# Patient Record
Sex: MALE | Race: WHITE | NOT HISPANIC OR LATINO | Employment: FULL TIME | URBAN - METROPOLITAN AREA
[De-identification: names, ages, dates, MRNs, and addresses within clinical notes are randomized per-mention and may not be internally consistent; named-entity substitution may affect disease eponyms.]

---

## 2017-11-19 PROBLEM — R07.2 PRECORDIAL PAIN: Status: ACTIVE | Noted: 2017-11-19

## 2017-11-19 PROBLEM — I48.91 A-FIB: Status: ACTIVE | Noted: 2017-11-19

## 2020-01-22 ENCOUNTER — TELEPHONE (OUTPATIENT)
Dept: ELECTROPHYSIOLOGY | Facility: CLINIC | Age: 50
End: 2020-01-22

## 2020-01-22 DIAGNOSIS — I49.8 OTHER SPECIFIED CARDIAC ARRHYTHMIAS: Primary | ICD-10-CM

## 2020-01-23 ENCOUNTER — TELEPHONE (OUTPATIENT)
Dept: ELECTROPHYSIOLOGY | Facility: CLINIC | Age: 50
End: 2020-01-23

## 2020-01-23 NOTE — TELEPHONE ENCOUNTER
Relayed Xochitl's message below.  ----- Message from Jenn Allen RN sent at 1/23/2020  8:09 AM CST -----  Contact: self / [phone]  We haven't seen him in 7 years and cannot make recommendations without seeing him. The ER doc spoke with his general cardiologist, Dr Correa before giving him rx for Xarelto. Can you please call him and ask him to get Dr Correa's recommendations until we see him?  Thanks  ----- Message -----  From: Geneva Santiago MA  Sent: 1/22/2020   4:48 PM CST  To: Jenn Allen RN    I scheduled him for 1/27/2020.  He wanted me to let you know that he was not about to get the Xarelto b/c it was very expensive. Is there something he can take instead?    [place]  ----- Message -----  From: Jenn Allen RN  Sent: 1/22/2020   3:42 PM CST  To: Geneva Santiago MA    Can you please call? He was in ER today and needs appt for follow up. We have not seen him since 2013. Will need to be set up as new patient.   ----- Message -----  From: Geneva Santiago MA  Sent: 1/22/2020   3:21 PM CST  To: Jenn Allen RN        ----- Message -----  From: Kim Peraza  Sent: 1/22/2020   2:04 PM CST  To: Gerald Carrera Staff    Needs to speak with you on an appointment as well as medication . Please advise    He was a patient a long time ago, he was in the ER today for Atrial fibrillation

## 2020-01-26 NOTE — PROGRESS NOTES
Subjective:    Patient ID:  Peter Gardner Jr. is a 49 y.o. male who presents for evaluation of Atrial Fibrillation      HPI 50 yo male with h/o paroxysmal symptomatic atrial fibrillation, atrial flutter, cardiomyopathy.    Update:  Has had paroxysmal atrial fibrillation.  H/o cardiomyopathy at times of atrial fibrillation. Last echo, while in normal rhythm, was normal. Has strong aversion to medications.   Underwent PVI 4/27/12 (Cryo-ablation) without complications.  Ultimately developed symptomatic recurrence, paroxysmal.  He has utilized Flecainide as pill in pocket in the past.  Last seen by me in 2013, at which time I had recommended repeat ablation.  He deferred.    Update:  2017 presented to [facility] with symptomatic recurrence.  Took Flecainide.  Upon arrival to [facility] he was in atrial flutter.    Echo 11/19/17 EF 45-50%.  1/22/20 presented with symptomatic recurrence.  Was discharged in AF.  Prescribed Xarelto.  Deferred secondary to cost.  Prescribed metoprolol.  Feels poorly on this (trouble with focus, fatigue).  Notes palpitations at night lasting a couple of seconds.  ECG today reveals sinus bradycardia at 51 bpm.      Review of Systems   Constitution: Negative. Negative for malaise/fatigue.   Cardiovascular: Positive for palpitations. Negative for chest pain, dyspnea on exertion, irregular heartbeat, leg swelling, near-syncope, orthopnea, paroxysmal nocturnal dyspnea and syncope.   Respiratory: Negative for cough and shortness of breath.    Neurological: Negative for dizziness, light-headedness and weakness.   All other systems reviewed and are negative.       Objective:    Physical Exam   Constitutional: He is oriented to person, place, and time. He appears well-developed and well-nourished.   Eyes: Conjunctivae are normal. No scleral icterus.   Neck: No JVD present. No tracheal deviation present.   Cardiovascular: Regular rhythm and normal heart sounds. Bradycardia present. PMI is not displaced.    Pulmonary/Chest: Effort normal and breath sounds normal. No respiratory distress.   Abdominal: Soft. There is no hepatosplenomegaly. There is no tenderness.   Musculoskeletal: He exhibits no edema (lower extremity) or tenderness.   Neurological: He is alert and oriented to person, place, and time.   Skin: Skin is warm and dry. No rash noted.   Psychiatric: He has a normal mood and affect. His behavior is normal.         Assessment:       1. Paroxysmal atrial fibrillation    2. Atrial flutter, unspecified type    3. Nonischemic cardiomyopathy    4. Chronic systolic heart failure    5. Palpitations         Plan:       Has had rare paroxysmal recurrence (once every 3 years).  Discontinue Flecainide.  I have asked him to continue Toprol for now. If continues to feel poorly after one week >> discontinue.    CHADSVASc is 0.  Defer anticoagulation for now.  Echo with color flow.  If has more frequent recurrence >> repeat PVI + RFA for atrial flutter.

## 2020-01-27 ENCOUNTER — CLINICAL SUPPORT (OUTPATIENT)
Dept: CARDIOLOGY | Facility: CLINIC | Age: 50
End: 2020-01-27
Attending: INTERNAL MEDICINE
Payer: COMMERCIAL

## 2020-01-27 ENCOUNTER — OFFICE VISIT (OUTPATIENT)
Dept: ELECTROPHYSIOLOGY | Facility: CLINIC | Age: 50
End: 2020-01-27
Payer: COMMERCIAL

## 2020-01-27 ENCOUNTER — HOSPITAL ENCOUNTER (OUTPATIENT)
Dept: CARDIOLOGY | Facility: CLINIC | Age: 50
Discharge: HOME | End: 2020-01-27
Payer: COMMERCIAL

## 2020-01-27 VITALS
SYSTOLIC BLOOD PRESSURE: 119 MMHG | WEIGHT: 190.94 LBS | DIASTOLIC BLOOD PRESSURE: 72 MMHG | HEART RATE: 51 BPM | HEIGHT: 71 IN | BODY MASS INDEX: 26.73 KG/M2

## 2020-01-27 VITALS
HEIGHT: 71 IN | DIASTOLIC BLOOD PRESSURE: 80 MMHG | HEART RATE: 55 BPM | BODY MASS INDEX: 26.73 KG/M2 | WEIGHT: 190.94 LBS | SYSTOLIC BLOOD PRESSURE: 110 MMHG

## 2020-01-27 DIAGNOSIS — R00.2 PALPITATIONS: ICD-10-CM

## 2020-01-27 DIAGNOSIS — I48.0 PAROXYSMAL ATRIAL FIBRILLATION: Primary | ICD-10-CM

## 2020-01-27 DIAGNOSIS — I42.8 NONISCHEMIC CARDIOMYOPATHY: ICD-10-CM

## 2020-01-27 DIAGNOSIS — I50.22 CHRONIC SYSTOLIC HEART FAILURE: ICD-10-CM

## 2020-01-27 DIAGNOSIS — I48.92 ATRIAL FLUTTER, UNSPECIFIED TYPE: ICD-10-CM

## 2020-01-27 DIAGNOSIS — I48.0 PAROXYSMAL ATRIAL FIBRILLATION: ICD-10-CM

## 2020-01-27 DIAGNOSIS — I49.8 OTHER SPECIFIED CARDIAC ARRHYTHMIAS: ICD-10-CM

## 2020-01-27 PROCEDURE — 93005 RHYTHM STRIP: ICD-10-PCS | Mod: S$GLB,,, | Performed by: INTERNAL MEDICINE

## 2020-01-27 PROCEDURE — 93306 TTE W/DOPPLER COMPLETE: CPT | Mod: S$GLB,,, | Performed by: INTERNAL MEDICINE

## 2020-01-27 PROCEDURE — 99999 PR PBB SHADOW E&M-EST. PATIENT-LVL II: ICD-10-PCS | Mod: PBBFAC,,,

## 2020-01-27 PROCEDURE — 99999 PR PBB SHADOW E&M-EST. PATIENT-LVL III: CPT | Mod: PBBFAC,,, | Performed by: INTERNAL MEDICINE

## 2020-01-27 PROCEDURE — 3008F PR BODY MASS INDEX (BMI) DOCUMENTED: ICD-10-PCS | Mod: CPTII,S$GLB,, | Performed by: INTERNAL MEDICINE

## 2020-01-27 PROCEDURE — 99999 PR PBB SHADOW E&M-EST. PATIENT-LVL III: ICD-10-PCS | Mod: PBBFAC,,, | Performed by: INTERNAL MEDICINE

## 2020-01-27 PROCEDURE — 93010 RHYTHM STRIP: ICD-10-PCS | Mod: S$GLB,,, | Performed by: INTERNAL MEDICINE

## 2020-01-27 PROCEDURE — 3008F BODY MASS INDEX DOCD: CPT | Mod: CPTII,S$GLB,, | Performed by: INTERNAL MEDICINE

## 2020-01-27 PROCEDURE — 99204 PR OFFICE/OUTPT VISIT, NEW, LEVL IV, 45-59 MIN: ICD-10-PCS | Mod: S$GLB,,, | Performed by: INTERNAL MEDICINE

## 2020-01-27 PROCEDURE — 93010 ELECTROCARDIOGRAM REPORT: CPT | Mod: S$GLB,,, | Performed by: INTERNAL MEDICINE

## 2020-01-27 PROCEDURE — 93005 ELECTROCARDIOGRAM TRACING: CPT | Mod: S$GLB,,, | Performed by: INTERNAL MEDICINE

## 2020-01-27 PROCEDURE — 93306 ECHO (CUPID ONLY): ICD-10-PCS | Mod: S$GLB,,, | Performed by: INTERNAL MEDICINE

## 2020-01-27 PROCEDURE — 99204 OFFICE O/P NEW MOD 45 MIN: CPT | Mod: S$GLB,,, | Performed by: INTERNAL MEDICINE

## 2020-01-27 PROCEDURE — 99999 PR PBB SHADOW E&M-EST. PATIENT-LVL II: CPT | Mod: PBBFAC,,,

## 2020-01-27 RX ORDER — FLECAINIDE ACETATE 100 MG/1
300 TABLET ORAL DAILY PRN
Qty: 60 TABLET | Refills: 3 | Status: SHIPPED | OUTPATIENT
Start: 2020-01-27 | End: 2020-02-26

## 2020-01-27 NOTE — LETTER
January 27, 2020      Bo Warner MD  [address]  [address]  [place] [address]           [facility]  [address]  [place] [address]  Phone: [phone]  Fax: [phone]          Patient: Peter Gardner Jr.   MR Number: [id number]   YOB: 1970   Date of Visit: 1/27/2020       Dear Dr. Bo Warner:    Thank you for referring Peter Gardner to me for evaluation. Attached you will find relevant portions of my assessment and plan of care.    If you have questions, please do not hesitate to call me. I look forward to following Peter Gardner along with you.    Sincerely,    Deandre Duarte MD    Enclosure  CC:  No Recipients    If you would like to receive this communication electronically, please contact [email or web address] or [phone] to request more information on [facility] access.    For providers and/or their staff who would like to refer a patient to [facility], please contact us through our one-stop-shop provider referral line, [facility], at [phone].    If you feel you have received this communication in error or would no longer like to receive these types of communications, please e-mail [email or web address]

## 2020-01-28 ENCOUNTER — TELEPHONE (OUTPATIENT)
Dept: ELECTROPHYSIOLOGY | Facility: CLINIC | Age: 50
End: 2020-01-28

## 2020-01-28 LAB
ASCENDING AORTA: 2.92 CM
AV INDEX (PROSTH): 0.77
AV MEAN GRADIENT: 5 MMHG
AV PEAK GRADIENT: 7 MMHG
AV VALVE AREA: 3.05 CM2
AV VELOCITY RATIO: 0.9
BSA FOR ECHO PROCEDURE: 2.08 M2
CV ECHO LV RWT: 0.33 CM
DOP CALC AO PEAK VEL: 1.29 M/S
DOP CALC AO VTI: 31.95 CM
DOP CALC LVOT AREA: 3.9 CM2
DOP CALC LVOT DIAMETER: 2.24 CM
DOP CALC LVOT PEAK VEL: 1.16 M/S
DOP CALC LVOT STROKE VOLUME: 97.33 CM3
DOP CALCLVOT PEAK VEL VTI: 24.71 CM
E WAVE DECELERATION TIME: 225.6 MSEC
E/A RATIO: 2.41
E/E' RATIO: 6.09 M/S
ECHO LV POSTERIOR WALL: 0.85 CM (ref 0.6–1.1)
FRACTIONAL SHORTENING: 28 % (ref 28–44)
INTERVENTRICULAR SEPTUM: 0.97 CM (ref 0.6–1.1)
IVRT: 0.1 MSEC
LA MAJOR: 5.46 CM
LA MINOR: 5.18 CM
LA WIDTH: 3.26 CM
LEFT ATRIUM SIZE: 3.62 CM
LEFT ATRIUM VOLUME INDEX: 25.8 ML/M2
LEFT ATRIUM VOLUME: 53.33 CM3
LEFT INTERNAL DIMENSION IN SYSTOLE: 3.68 CM (ref 2.1–4)
LEFT VENTRICLE DIASTOLIC VOLUME INDEX: 60.23 ML/M2
LEFT VENTRICLE DIASTOLIC VOLUME: 124.52 ML
LEFT VENTRICLE MASS INDEX: 81 G/M2
LEFT VENTRICLE SYSTOLIC VOLUME INDEX: 27.8 ML/M2
LEFT VENTRICLE SYSTOLIC VOLUME: 57.45 ML
LEFT VENTRICULAR INTERNAL DIMENSION IN DIASTOLE: 5.11 CM (ref 3.5–6)
LEFT VENTRICULAR MASS: 166.48 G
LV LATERAL E/E' RATIO: 5.38 M/S
LV SEPTAL E/E' RATIO: 7 M/S
MV PEAK A VEL: 0.29 M/S
MV PEAK E VEL: 0.7 M/S
PISA TR MAX VEL: 1.63 M/S
PULM VEIN S/D RATIO: 0.49
PV PEAK D VEL: 0.61 M/S
PV PEAK S VEL: 0.3 M/S
RA MAJOR: 5.36 CM
RA PRESSURE: 3 MMHG
RA WIDTH: 3.74 CM
RIGHT VENTRICULAR END-DIASTOLIC DIMENSION: 3.78 CM
SINUS: 3.39 CM
STJ: 2.83 CM
TDI LATERAL: 0.13 M/S
TDI SEPTAL: 0.1 M/S
TDI: 0.12 M/S
TR MAX PG: 11 MMHG
TRICUSPID ANNULAR PLANE SYSTOLIC EXCURSION: 2.66 CM
TV REST PULMONARY ARTERY PRESSURE: 14 MMHG

## 2020-01-28 NOTE — TELEPHONE ENCOUNTER
----- Message from Deandre Duarte MD sent at 1/28/2020 11:04 AM CST -----  EF is back to normal.  Please relay to patient

## 2020-01-28 NOTE — TELEPHONE ENCOUNTER
Spoke with patient and relayed the results of echo, per DR Duarte's note:    EF is back to normal.  Please relay to patient    He verbalized understanding and voiced no questions or concerns.

## 2021-05-06 ENCOUNTER — PATIENT MESSAGE (OUTPATIENT)
Dept: RESEARCH | Facility: HOSPITAL | Age: 51
End: 2021-05-06

## 2023-04-09 PROBLEM — K35.80 ACUTE APPENDICITIS: Status: ACTIVE | Noted: 2023-04-09
